# Patient Record
Sex: FEMALE | Race: WHITE | NOT HISPANIC OR LATINO | Employment: FULL TIME | URBAN - METROPOLITAN AREA
[De-identification: names, ages, dates, MRNs, and addresses within clinical notes are randomized per-mention and may not be internally consistent; named-entity substitution may affect disease eponyms.]

---

## 2017-08-23 ENCOUNTER — TELEPHONE (OUTPATIENT)
Dept: FAMILY MEDICINE | Facility: CLINIC | Age: 62
End: 2017-08-23

## 2017-08-23 ENCOUNTER — PATIENT OUTREACH (OUTPATIENT)
Dept: ADMINISTRATIVE | Facility: HOSPITAL | Age: 62
End: 2017-08-23

## 2017-08-23 NOTE — TELEPHONE ENCOUNTER
----- Message from Whitley Santa sent at 8/23/2017  2:26 PM CDT -----  returning nurse call. Please call back at [phone]. [email or web address]

## 2017-08-23 NOTE — LETTER
August 23, 2017    Komal Altamirano  [address]  [place] [address]           [facility]  [address]  [facility] [address]  Phone: [phone] Dear Komal Altamirano    In the spirit of maintaining your good health, our system indicates that you have not been seen in the office in over 12 months and due for a visit.     Our system indicates that you are due for the following:   Health Maintenance Due   Topic Date Due    Hepatitis C Screening  1955    TETANUS VACCINE  03/26/1975    Mammogram  02/12/2017    Zoster Vaccine  03/26/2017    Influenza Vaccine  08/01/2017     Alcides Parmar MD would like you to schedule an appointment for an annual exam at your earliest convenience. If you have completed any of these health maintenance requirements at an outside facility, please contact our office so we may update your health record.    If your PRIMARY CARE PHYSICIAN has changed please contact your insurance company to reflect the correct physician.    If you have any issues or need assistance in scheduling this appointment, please call the number below.     MIK Lerma  Care Coordination Department  [facility]  [phone]